# Patient Record
Sex: MALE | Race: WHITE
[De-identification: names, ages, dates, MRNs, and addresses within clinical notes are randomized per-mention and may not be internally consistent; named-entity substitution may affect disease eponyms.]

---

## 2018-04-05 ENCOUNTER — HOSPITAL ENCOUNTER (EMERGENCY)
Dept: HOSPITAL 47 - EC | Age: 33
Discharge: HOME | End: 2018-04-05
Payer: COMMERCIAL

## 2018-04-05 VITALS
HEART RATE: 63 BPM | DIASTOLIC BLOOD PRESSURE: 63 MMHG | SYSTOLIC BLOOD PRESSURE: 129 MMHG | RESPIRATION RATE: 16 BRPM | TEMPERATURE: 97.6 F
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
## 2018-04-05 DIAGNOSIS — S76.922A: ICD-10-CM
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
## 2018-04-05 DIAGNOSIS — S71.112A: Primary | ICD-10-CM

## 2018-04-05 PROCEDURE — 99283 EMERGENCY DEPT VISIT LOW MDM: CPT

## 2018-04-05 PROCEDURE — 12034 INTMD RPR S/TR/EXT 7.6-12.5: CPT

## 2018-04-05 NOTE — XR
EXAMINATION TYPE: XR femur LT

 

DATE OF EXAM: 4/5/2018

 

COMPARISON: NONE

 

HISTORY: Laceration left thigh

 

TECHNIQUE: 2 view left femur

 

FINDINGS: No acute fractures are evident. There is a large laceration along the anterior mid thigh. N
o large radiopaque foreign bodies are evident. On the lateral projection of punctate density may be a
t the margin an AP view of second punctate density may be adjacent at the laceration.

 

IMPRESSION:

1.  Very tiny punctate foreign bodies may be at the laceration inferior aspect.

2. No large radiopaque foreign bodies.

3. No acute osseous abnormality.

## 2018-04-05 NOTE — ED
General Adult HPI





- General


Chief complaint: Wound/Laceration


Stated complaint: Leg lac


Time Seen by Provider: 04/05/18 20:47


Source: patient, RN notes reviewed


Mode of arrival: ambulatory


Limitations: no limitations





- History of Present Illness


Initial comments: 





32-year-old male presents to the emergency department for a chief complaint of 

laceration to the left lateral thigh.  Patient states he was climbing out of 

the bed of his truck when he stepped down and felt his leg catch on something 

which caused a laceration.  Patient denies falling or hitting his head.   

Patient states it was very painful at first but the pain has subsided somewhat.

  Patient states he did not fall or hit his head.  Patient states he is able to 

walk using the left leg.  Patient has full sensation in the left leg.  Patient 

states his tetanus is definitely up to date. 





- Related Data


 Previous Rx's











 Medication  Instructions  Recorded


 


Cephalexin [Keflex] 500 mg PO Q12HR #10 cap 04/05/18











 Allergies











Allergy/AdvReac Type Severity Reaction Status Date / Time


 


No Known Allergies Allergy   Verified 04/05/18 20:26














Review of Systems


ROS Statement: 


Those systems with pertinent positive or pertinent negative responses have been 

documented in the HPI.





ROS Other: All systems not noted in ROS Statement are negative.





Past Medical History


Past Medical History: No Reported History


Past Surgical History: Hernia Repair, Orthopedic Surgery


Smoking Status: Never smoker


Past Alcohol Use History: Occasional


Past Drug Use History: None Reported





General Exam


Limitations: no limitations


General appearance: alert, in no apparent distress


Head exam: Present: atraumatic, normocephalic, normal inspection


Eye exam: Present: normal appearance, PERRL, EOMI.  Absent: scleral icterus, 

conjunctival injection, periorbital swelling


Respiratory exam: Present: normal lung sounds bilaterally.  Absent: respiratory 

distress, wheezes, rales, rhonchi, stridor


Cardiovascular Exam: Present: regular rate, normal rhythm, normal heart sounds.

  Absent: systolic murmur, diastolic murmur, rubs, gallop, clicks


Extremities exam: Present: other (There is an 8 cm laceration to the left 

anterior lateral thigh.  Fashion is exposed.  There is a small 3 cm laceration 

through the fascia.  Muscle appears intact.  Patient has full range of motion 

of the left lower extremity.  Pedal pulse 2+.  Capillary refill less than 2 

seconds.)





Course


 Vital Signs











  04/05/18 04/05/18





  20:26 23:04


 


Temperature 97.8 F 97.6 F


 


Pulse Rate 69 63


 


Respiratory 18 16





Rate  


 


Blood Pressure 137/72 129/63


 


O2 Sat by Pulse 98 97





Oximetry  














Procedures





- Procedures


Initial comment: 





Body area: left anterolateral thigh


Laceration length: 8 cm 


Foreign bodies: no foreign bodies 


Tendon involvement: none 


Nerve involvement: none


 Vascular damage: no


 Anesthesia: local infiltration 


Local anesthetic: 20 mL 1% lidocaine w/ epi 


Preparation: Patient was prepped and draped in the usual sterile fashion. 


Irrigation solution: saline + iodine


Irrigation method: sterile water /iodine jet lavage 


Skin closure: 4 simple interrupted sutures of absorbable 4-0 Vicryl were used 

to close the fascia as well as deep tissue.  Eleven simple interrupted and one 

horizontal mattress 3-0 Ethilon sutures used to close the superficial tissue.


Technique: interupted 


Dressing: antibiotic ointment/ gauze 


Patient tolerance: Patient tolerated the procedure well with no immediate 

complications.





Medical Decision Making





- Medical Decision Making





32-year-old male presents to the emergency department for chief complaint of 

laceration occurring one hour ago.  Patient was stepping out of the bed of his 

truck when his leg caught and he felt his skin tear.  Patient states he is up-to

-date on his tetanus.  Patient has full movement and sensation of the left 

lower extremity.  Neurovascular intact.  X-ray of the left femur shows no acute 

fractures.  No large radiopaque foreign bodies evident. There may be a very 

tiny punctate foreign bodies at the inferior aspect of the laceration.  Wound 

was cleaned again with sterile saline and iodine with jet lavage.  The fascia 

was closed with absorbable Vicryl sutures.  The wound was then approximated 

with one horizontal mattress and 11 simple interrupted sutures made of 3-0 

Ethilon.  Patient was put on Keflex prophylactically.  He will follow up with 

primary care provider in one to 2 days.  He will return in 10 days to have 

sutures removed.





Disposition


Clinical Impression: 


 Laceration





Disposition: HOME SELF-CARE


Condition: Good


Instructions:  Care For Your Stitches (ED), Laceration (ED)


Additional Instructions: 


Please take Keflex as directed.  Take ibuprofen or Tylenol for pain relief.  

Please follow-up with your primary care provider in one to 2 days.  Please 

return to the emergency department if you notice signs of infection or develop 

fevers.  Please return to have stitches removed in 10 days.


Prescriptions: 


Cephalexin [Keflex] 500 mg PO Q12HR #10 cap


Referrals: 


Dilan Glass MD [Primary Care Provider] - 1-2 days


Time of Disposition: 22:58

## 2019-04-02 ENCOUNTER — HOSPITAL ENCOUNTER (OUTPATIENT)
Dept: HOSPITAL 47 - LABWHC1 | Age: 34
Discharge: HOME | End: 2019-04-02
Attending: ORTHOPAEDIC SURGERY
Payer: COMMERCIAL

## 2019-04-02 DIAGNOSIS — Z01.812: ICD-10-CM

## 2019-04-02 DIAGNOSIS — Z01.818: Primary | ICD-10-CM

## 2019-04-02 DIAGNOSIS — M51.27: ICD-10-CM

## 2019-04-02 LAB
ANION GAP SERPL CALC-SCNC: 8.1 MMOL/L (ref 4–12)
APTT BLD: 26.2 SEC (ref 22–30)
BUN SERPL-SCNC: 20 MG/DL (ref 9–27)
CALCIUM SPEC-MCNC: 9 MG/DL (ref 8.7–10.3)
CHLORIDE SERPL-SCNC: 105 MMOL/L (ref 96–109)
CO2 SERPL-SCNC: 26.9 MMOL/L (ref 21.6–31.8)
ERYTHROCYTE [DISTWIDTH] IN BLOOD BY AUTOMATED COUNT: 4.65 M/UL (ref 4.3–5.9)
ERYTHROCYTE [DISTWIDTH] IN BLOOD: 12.3 % (ref 11.5–15.5)
GLUCOSE SERPL-MCNC: 78 MG/DL (ref 70–110)
HCT VFR BLD AUTO: 43.4 % (ref 39–53)
HGB BLD-MCNC: 14.3 GM/DL (ref 13–17.5)
INR PPP: 1 (ref ?–1.2)
MCH RBC QN AUTO: 30.8 PG (ref 25–35)
MCHC RBC AUTO-ENTMCNC: 33 G/DL (ref 31–37)
MCV RBC AUTO: 93.3 FL (ref 80–100)
PH UR: 6.5 [PH] (ref 5–8)
PLATELET # BLD AUTO: 257 K/UL (ref 150–450)
POTASSIUM SERPL-SCNC: 3.9 MMOL/L (ref 3.5–5.5)
PT BLD: 10.7 SEC (ref 9–12)
SODIUM SERPL-SCNC: 140 MMOL/L (ref 135–145)
SP GR UR: 1.01 (ref 1–1.03)
UROBILINOGEN UR QL STRIP: <2 MG/DL (ref ?–2)
WBC # BLD AUTO: 7.4 K/UL (ref 3.8–10.6)

## 2019-04-02 PROCEDURE — 80048 BASIC METABOLIC PNL TOTAL CA: CPT

## 2019-04-02 PROCEDURE — 85730 THROMBOPLASTIN TIME PARTIAL: CPT

## 2019-04-02 PROCEDURE — 81003 URINALYSIS AUTO W/O SCOPE: CPT

## 2019-04-02 PROCEDURE — 85610 PROTHROMBIN TIME: CPT

## 2019-04-02 PROCEDURE — 36415 COLL VENOUS BLD VENIPUNCTURE: CPT

## 2019-04-02 PROCEDURE — 93005 ELECTROCARDIOGRAM TRACING: CPT

## 2019-04-02 PROCEDURE — 85027 COMPLETE CBC AUTOMATED: CPT

## 2019-04-02 PROCEDURE — 71046 X-RAY EXAM CHEST 2 VIEWS: CPT

## 2019-04-02 NOTE — XR
EXAMINATION TYPE: XR chest 2V

 

DATE OF EXAM: 4/2/2019

 

COMPARISON: NONE

 

TECHNIQUE: PA and lateral views submitted.

 

HISTORY: Presurgical

 

FINDINGS:

The lungs are clear and  there is no pneumothorax, pleural effusion, or focal pneumonia.  Hyperinflat
ion suggests asthma or COPD. Correlate clinically. Apical pleural thickening noted.

 

IMPRESSION: 

1. No acute process.